# Patient Record
Sex: FEMALE | Race: OTHER
[De-identification: names, ages, dates, MRNs, and addresses within clinical notes are randomized per-mention and may not be internally consistent; named-entity substitution may affect disease eponyms.]

---

## 2018-09-20 ENCOUNTER — HOSPITAL ENCOUNTER (OUTPATIENT)
Dept: HOSPITAL 61 - ENDOS | Age: 63
Discharge: HOME | End: 2018-09-20
Attending: INTERNAL MEDICINE
Payer: MEDICARE

## 2018-09-20 VITALS — SYSTOLIC BLOOD PRESSURE: 126 MMHG | DIASTOLIC BLOOD PRESSURE: 83 MMHG

## 2018-09-20 DIAGNOSIS — Z90.5: ICD-10-CM

## 2018-09-20 DIAGNOSIS — I10: ICD-10-CM

## 2018-09-20 DIAGNOSIS — Z90.721: ICD-10-CM

## 2018-09-20 DIAGNOSIS — E03.9: ICD-10-CM

## 2018-09-20 DIAGNOSIS — E78.5: ICD-10-CM

## 2018-09-20 DIAGNOSIS — K64.1: Primary | ICD-10-CM

## 2018-09-20 DIAGNOSIS — Z79.899: ICD-10-CM

## 2018-09-20 LAB
BASOPHILS # BLD AUTO: 0 X10^3/UL (ref 0–0.2)
BASOPHILS NFR BLD: 1 % (ref 0–3)
EOSINOPHIL NFR BLD: 0.2 X10^3/UL (ref 0–0.7)
EOSINOPHIL NFR BLD: 3 % (ref 0–3)
ERYTHROCYTE [DISTWIDTH] IN BLOOD BY AUTOMATED COUNT: 16 % (ref 11.5–14.5)
HCT VFR BLD CALC: 34.5 % (ref 36–47)
HGB BLD-MCNC: 11.9 G/DL (ref 12–15.5)
LYMPHOCYTES # BLD: 2.6 X10^3/UL (ref 1–4.8)
LYMPHOCYTES NFR BLD AUTO: 41 % (ref 24–48)
MCH RBC QN AUTO: 34 PG (ref 25–35)
MCHC RBC AUTO-ENTMCNC: 34 G/DL (ref 31–37)
MCV RBC AUTO: 98 FL (ref 79–100)
MONO #: 0.6 X10^3/UL (ref 0–1.1)
MONOCYTES NFR BLD: 10 % (ref 0–9)
NEUT #: 2.9 X10^3UL (ref 1.8–7.7)
NEUTROPHILS NFR BLD AUTO: 46 % (ref 31–73)
PLATELET # BLD AUTO: 168 X10^3/UL (ref 140–400)
RBC # BLD AUTO: 3.53 X10^6/UL (ref 3.5–5.4)
WBC # BLD AUTO: 6.4 X10^3/UL (ref 4–11)

## 2018-09-20 PROCEDURE — 36415 COLL VENOUS BLD VENIPUNCTURE: CPT

## 2018-09-20 PROCEDURE — 85025 COMPLETE CBC W/AUTO DIFF WBC: CPT

## 2018-09-20 PROCEDURE — 45378 DIAGNOSTIC COLONOSCOPY: CPT

## 2018-09-20 NOTE — CONS
DATE OF CONSULTATION:  09/20/2018



REFERRING PHYSICIAN:  Dr. Ferreira.



REASON FOR CONSULTATION:  Melena.



HISTORY OF PRESENT ILLNESS:  A 62-year-old  female whose past medical

history is significant for nephrectomy as well as hypertension, hypothyroidism

is seen for colonoscopy.  She states she has been having dark stools recently. 

There has been no nausea, no vomiting, no abdominal pain, no change in weight or

appetite.  Colonoscopy approximately 4 years ago with Dr. Dietz was apparently

unrevealing.  Continued issues, she is here today.



PAST MEDICAL HISTORY:  Hypertension, hyperlipidemia, hypothyroidism, status post

nephrectomy, status post oophorectomy.



ALLERGIES:  None.



MEDICATIONS:  Include amlodipine, calcium carbonate, clonidine, fenofibrate,

folic acid, levothyroxine, lidocaine, propranolol and Renvela.



SOCIAL HISTORY:  Does not drink or smoke.



FAMILY HISTORY:  Noncontributory.



REVIEW OF SYSTEMS:  Per records is difficult to obtain due to language barrier.



PHYSICAL EXAMINATION:

GENERAL:  Well-nourished, well-developed  female.

VITAL SIGNS:  Temperature is 98, pulse 80, respirations 18.

HEENT:  Normocephalic and atraumatic.  Pupils and extraocular muscles are not

tested.  Sclerae anicteric.

NECK:  Supple.

LUNGS:  Clear.

CARDIOVASCULAR:  Reveals an S1, S2 without S3, S4 or appreciable murmur.

ABDOMEN:  Soft abdomen, normal bowel sounds without appreciable

hepatosplenomegaly.

EXTREMITIES:  Reveals no cyanosis, clubbing or edema.



IMPRESSION:  Melena.  The etiology is to be determined.  Differential includes

colon polyps, colon cancer, arteriovenous malformations and inflammatory bowel

disease as well as possible peptic ulcer disease.  We will therefore recommend

colonoscopy to further assess.  If this is unrevealing, further consideration if

the bleeding would continue and the patient remains anemic for upper endoscopy.

 



______________________________

CHIVO DODD MD



DR:  SSP/chandana  JOB#:  [id number] / [id number]

DD:  09/20/2018 12:23  DT:  09/20/2018 13:24